# Patient Record
Sex: MALE | Race: WHITE
[De-identification: names, ages, dates, MRNs, and addresses within clinical notes are randomized per-mention and may not be internally consistent; named-entity substitution may affect disease eponyms.]

---

## 2018-03-10 ENCOUNTER — HOSPITAL ENCOUNTER (EMERGENCY)
Dept: HOSPITAL 41 - JD.ED | Age: 63
Discharge: HOME | End: 2018-03-10
Payer: COMMERCIAL

## 2018-03-10 VITALS — DIASTOLIC BLOOD PRESSURE: 78 MMHG | SYSTOLIC BLOOD PRESSURE: 171 MMHG

## 2018-03-10 DIAGNOSIS — N13.2: Primary | ICD-10-CM

## 2018-03-10 DIAGNOSIS — Z79.82: ICD-10-CM

## 2018-03-10 DIAGNOSIS — E03.9: ICD-10-CM

## 2018-03-10 DIAGNOSIS — Z79.899: ICD-10-CM

## 2018-03-10 DIAGNOSIS — E78.00: ICD-10-CM

## 2018-03-10 DIAGNOSIS — I10: ICD-10-CM

## 2018-03-10 DIAGNOSIS — K21.9: ICD-10-CM

## 2018-03-10 PROCEDURE — 96374 THER/PROPH/DIAG INJ IV PUSH: CPT

## 2018-03-10 PROCEDURE — 74019 RADEX ABDOMEN 2 VIEWS: CPT

## 2018-03-10 PROCEDURE — 86140 C-REACTIVE PROTEIN: CPT

## 2018-03-10 PROCEDURE — 36415 COLL VENOUS BLD VENIPUNCTURE: CPT

## 2018-03-10 PROCEDURE — 96361 HYDRATE IV INFUSION ADD-ON: CPT

## 2018-03-10 PROCEDURE — 74176 CT ABD & PELVIS W/O CONTRAST: CPT

## 2018-03-10 PROCEDURE — 99285 EMERGENCY DEPT VISIT HI MDM: CPT

## 2018-03-10 PROCEDURE — 96376 TX/PRO/DX INJ SAME DRUG ADON: CPT

## 2018-03-10 PROCEDURE — 80053 COMPREHEN METABOLIC PANEL: CPT

## 2018-03-10 PROCEDURE — 85025 COMPLETE CBC W/AUTO DIFF WBC: CPT

## 2018-03-10 PROCEDURE — 81001 URINALYSIS AUTO W/SCOPE: CPT

## 2018-03-10 PROCEDURE — 96375 TX/PRO/DX INJ NEW DRUG ADDON: CPT

## 2018-03-12 NOTE — CT
CT abdomen and pelvis

 

Technique: Multiple axial sections were obtained from above the dome 

of the diaphragm inferiorly through the pubic symphysis.  Intravenous 

and oral contrast not utilized.  Study has been performed as a 

ureteral stone protocol.

 

Comparison: Prior abdominal x-ray performed earlier on the same day as

 well as previous CT abdomen and pelvis exam of 01/06/13.

 

Findings: Right kidney shows hydronephrosis.  Inflammatory change is 

noted around the right kidney as well as right ureter.  Findings are 

caused by an obstructing stone within the distal right ureter 

measuring approximately 6-7 mm.  This ureteral stone is located 

approximately 2-3 cm from the UVJ.

 

No additional ureteral calculi are seen.  No renal calculi are 

identified.  Three cysts are seen within the left kidney.  Largest 

cyst measures 6.1 cm in size and has increased in size from previous 

study which measured approximately 4.0 cm on previous exam.  Second 

cyst measures 2.2 cm which is also increased in size from prior study 

at which time it measured 1.1 cm.  Third cyst seen is more inferiorly 

within the right kidney measuring 1.9 cm in size which has also 

increased slightly from prior exam.

 

Diffuse fatty infiltration seen within the liver.  Visualized lung 

bases show slight fibrosis.  Spleen appears within normal limits.  

Adrenal glands show no nodule.  Pancreas is within normal limits.  

Gallbladder contains no calcified gallstones.  Aorta shows mild 

atherosclerotic change without aneurysmal dilatation.  No 

retroperitoneal adenopathy is seen.  Numerous diverticuli are seen 

throughout the colon.  No inflammatory change of diverticulitis is 

seen.  Prostate gland is mildly enlarged and contains calcifications. 

 No free fluid is seen.  Appendix felt to be seen and is normal in 

size.

 

Bone window settings were reviewed which show scattered degenerative 

change within the spine with disc space narrowing, endplate 

osteophytes as well as vacuum phenomena within the L3-L4 and L4-L5 

discs.

 

Impression:

1.  Distal right ureteral stone measuring approximately 6-7 mm in 

size.

2.  Other nonacute findings as described above.

 

Diagnostic code #3

## 2019-04-12 ENCOUNTER — HOSPITAL ENCOUNTER (EMERGENCY)
Dept: HOSPITAL 41 - JD.ED | Age: 64
Discharge: HOME | End: 2019-04-12
Payer: COMMERCIAL

## 2019-04-12 VITALS — SYSTOLIC BLOOD PRESSURE: 140 MMHG | DIASTOLIC BLOOD PRESSURE: 72 MMHG

## 2019-04-12 DIAGNOSIS — N40.1: Primary | ICD-10-CM

## 2019-04-12 DIAGNOSIS — I10: ICD-10-CM

## 2019-04-12 DIAGNOSIS — Z79.899: ICD-10-CM

## 2019-04-12 DIAGNOSIS — Z98.890: ICD-10-CM

## 2019-04-12 DIAGNOSIS — R33.8: ICD-10-CM

## 2019-04-12 DIAGNOSIS — E66.9: ICD-10-CM

## 2019-04-12 NOTE — EDM.PDOC
ED HPI GENERAL MEDICAL PROBLEM





- General


Chief Complaint: Genitourinary Problem


Stated Complaint: DIFFICULTY URINATING


Time Seen by Provider: 04/12/19 15:22


Source of Information: Reports: Patient, Family (Daughter), RN Notes Reviewed


History Limitations: Reports: No Limitations





- History of Present Illness


INITIAL COMMENTS - FREE TEXT/NARRATIVE: 





The patient states that he underwent excision of a right spermatocele at 

[facility] yesterday, Thursday, 4/11/2019. He states that he underwent 

general anesthesia for about one hour. He was discharged home the same day. He 

states that he has had a small but anticipated amount of bleeding to the 

scrotal incision, but now presents to the ED stating that he is having 

difficulty urinating. The patient has a history of BPH, but states that he 

stopped taking his tamsulosin about one month ago, because he did not think 

that it was doing much.





A bladder scan was performed here in the ED, finding 575 mL of urine. No prior 

history of urinary retention.





The patient's PCP is Dr. Mahoney.


His Urologist is Dr. Purvis.








  ** Scrotum


Pain Score (Numeric/FACES): 5





- Related Data


 Allergies











Allergy/AdvReac Type Severity Reaction Status Date / Time


 


No Known Allergies Allergy   Verified 04/12/19 15:06











Home Meds: 


 Home Meds





Allopurinol [Zyloprim] 100 mg PO DAILY 04/12/19 [History]


Ciprofloxacin [Ciprofloxacin HCl] 250 mg PO BID 04/12/19 [History]


Codeine/Promethazine [Phenergan with Codeine] 5 ml PO Q4H PRN 04/12/19 [History]


Fenofibrate Nanocrystallized [Fenofibrate] 145 mg PO DAILY 04/12/19 [History]


Flaxseed Oil 1,000 mg PO DAILY 04/12/19 [History]


Hydrocodone/Acetaminophen [Hydrocodon-Acetaminophen 5-325] 1 - 2 tab PO Q4H PRN 

04/12/19 [History]


Levothyroxine [Synthroid] 25 mcg PO DAILY 04/12/19 [History]


Losartan [Cozaar] 50 mg PO DAILY 04/12/19 [History]


Metoprolol Tartrate [Lopressor] 25 mg PO BID 04/12/19 [History]


Mometasone Furoate [Nasonex Spray] 2 spray RENETTA DAILY 04/12/19 [History]


Montelukast [Singulair] 10 mg PO BEDTIME 04/12/19 [History]


Niacin [Niacin ER] 500 mg PO BID 04/12/19 [History]


Omega-3/DHA/Epa/Fish Oil [Omega 3 500 Softgel] 1,000 mg PO DAILY 04/12/19 [

History]


Omeprazole 20 mg PO BID 04/12/19 [History]


Sildenafil Citrate [Sildenafil] 100 mg PO ASDIRECTED PRN 04/12/19 [History]


Tamsulosin [Flomax] 0.4 mg PO DAILY 04/12/19 [History]


atorvaSTATin [Lipitor] 20 mg PO DAILY 04/12/19 [History]


metFORMIN [Glucophage XR] 500 mg PO BIDMEALS 04/12/19 [History]











Past Medical History


HEENT History: Reports: Impaired Vision


Cardiovascular History: Reports: High Cholesterol, Hypertension


Respiratory History: Reports: Sleep Apnea (nightly CPAP 12)


Gastrointestinal History: Reports: Diverticulosis (diverticulitis), GERD, 

Hemorrhoids, Other (See Below) (Hepatic steatosis)


Genitourinary History: Reports: BPH (untreated), Renal Calculus


Musculoskeletal History: Reports: Gout


Endocrine/Metabolic History: Reports: Hypothyroidism, Obesity/BMI 30+, Other (

See Below) (Prediabetes)





- Past Surgical History


GI Surgical History: Reports: Colonoscopy (x 3), EGD (x 3), Hernia, Abdominal (

Paraumbilical, 1975)


Male  Surgical History: Reports: Other (See Below) (Right spermatocele 

excision 4/11/2019)


Dermatological Surgical History: Reports: Other (See Below) (Lipoma excised 

from under arm)





Social & Family History





- Tobacco Use


Smoking Status *Q: Never Smoker





- Caffeine Use


Caffeine Use: Reports: Soda


Other Caffeine Use: diet pepsi





- Alcohol Use


Alcohol Use History: Yes


Alcohol Use Frequency: Rarely





- Recreational Drug Use


Recreational Drug Use: No





- Living Situation & Occupation


Living situation: Reports: , Alone


Occupation: Retired





ED ROS GENERAL





- Review of Systems


Review Of Systems: ROS reveals no pertinent complaints other than HPI.





ED EXAM, RENAL/





- Physical Exam


Exam: See Below


Exam Limited By: No Limitations


General Appearance: Alert, WD/WN, No Apparent Distress


Eye Exam: Bilateral Eye: EOMI, Normal Inspection


Ears: Normal External Exam, Hearing Grossly Normal


Nose: Normal Inspection


Throat/Mouth: Normal Inspection, Normal Lips, Normal Voice, No Airway Compromise


Head: Atraumatic, Normocephalic


Neck: Normal Inspection, Full Range of Motion


Respiratory/Chest: No Respiratory Distress, Lungs Clear, Normal Breath Sounds, 

No Accessory Muscle Use


Cardiovascular: Normal Peripheral Pulses, Regular Rate, Rhythm, No Gallop, No 

JVD, No Murmur, No Rub


GI/Abdominal: Normal Bowel Sounds, Soft, No Organomegaly, No Distention, No 

Abnormal Bruit, No Mass, Tender (Mild, suprapubic only. Nontender elsewhere.), 

Other (Obese)


 (Male) Exam: Deferred


Rectal (Males) Exam: Deferred


Back Exam: Normal Inspection, Full Range of Motion, NT


Extremities: Normal Inspection, Normal Range of Motion, No Pedal Edema, Normal 

Capillary Refill


Neurological: Alert, Oriented, Normal Cognition, No Motor/Sensory Deficits


Psychiatric: Normal Affect


Skin Exam: Warm, Dry, Intact, Normal Color, No Rash





Course





- Vital Signs


Last Recorded V/S: 


 Last Vital Signs











Temp  37.1 C   04/12/19 14:57


 


Pulse  60   04/12/19 14:57


 


Resp  13   04/12/19 14:57


 


BP  140/72   04/12/19 14:57


 


Pulse Ox  95   04/12/19 14:57














- Orders/Labs/Meds


Orders: 


 Active Orders 24 hr











 Category Date Time Status


 


 Bladder Scan [RC] ASDIRECTED Care  04/12/19 15:32 Ordered














- Re-Assessments/Exams


Free Text/Narrative Re-Assessment/Exam: 





04/12/19 15:40


A bladder scan after the patient arrived to the ED found 575 mL of urine, 

however, this was not a post-void residual, as the patient states that he last 

urinated around 14:00 this afternoon. I have asked the patient to urinate as 

much as he can, after which we will check a post-void residual.








04/12/19 16:01


Post-void residual was 280 mL. I offered to place a catheter to a leg bag, 

however, the patient had been told by Dr. Purvis's office that he would not 

need one unless his residual was 300 mL or greater. The patient therefore 

declined a catheter. I will discharge him home with the recommendation that he 

restart his tamsulosin immediately, and return to the ED if he has any problems 

over the weekend. The patient will then follow-up with Dr. Purvis on Monday, 4

/15/2019.





Departure





- Departure


Time of Disposition: 16:06


Disposition: Home, Self-Care 01


Condition: Good


Clinical Impression: 


 Postoperative urinary retention, BPH (benign prostatic hyperplasia)








- Discharge Information


*PRESCRIPTION DRUG MONITORING PROGRAM REVIEWED*: Not Applicable


*COPY OF PRESCRIPTION DRUG MONITORING REPORT IN PATIENT [facility]: Not Applicable


Referrals: 


Favian Mahoney MD [Primary Care Provider] - 


Eden Pruvis MD [Consulting Physician] - 


Forms:  ED Department Discharge


Additional Instructions: 


You were seen in the emergency room after having difficulty urinating, 

following spermatocele surgery yesterday.





Your post-void residual was found to be 280 mL. Catheterization with a leg bag 

was offered, but declined.





We recommend that you restart your tamsulosin (Flomax) as soon as possible.





If you have any difficulty urinating over the weekend, please do not hesitate 

to return to the ER. Otherwise, please follow-up with your Urologist, Dr. Purvis, Monday, 4/15/2019.





- My Orders


Last 24 Hours: 


My Active Orders





04/12/19 15:32


Bladder Scan [RC] ASDIRECTED 














- Assessment/Plan


Last 24 Hours: 


My Active Orders





04/12/19 15:32


Bladder Scan [RC] ASDIRECTED

## 2020-07-29 NOTE — EDM.PDOC
ED HPI GENERAL MEDICAL PROBLEM





- General


Chief Complaint: Respiratory Problem


Stated Complaint: HIGH FEVER/COUGH/SHIVERING


Time Seen by Provider: 07/29/20 20:20


Source of Information: Reports: Patient


History Limitations: Reports: No Limitations





- History of Present Illness


INITIAL COMMENTS - FREE TEXT/NARRATIVE: 


65-year-old male presents to the ED for evaluation of acute onset of high fever 

with Rigor's and chills about 1600 hrs. today.  He did not feel real well 

yesterday with the onset of diffuse low back pain and was seen by his primary 

care provider  and had x-rays of his lower back which revealed 

degenerative arthritic change.  Currently having diffuse low back pain radiating

to both upper buttocks but not down past the mid thigh posteriorly.  Started on 

a course of prednisone which she started this morning and was referred to 

physio-which he is to start next Monday.  Did not feel real well this morning 

and slept until about noon.  He had a sausage kathy about noon at home.  

Subsequently he did have a strawberry Sunday from the [employer] about 1500 

hrs. today.  Developed fever and chills about 1600 hrs. today with his teeth 

almost chattering.  States he went out and sat on the hot sun for a while until 

he can warm up.  When he presented to the ED tonight his temperature is 103.6.  

He reports he did have a COVID test done in the clinic yesterday and it was sent

off and he did not receive results yet.  Reports he was out in [place] 

last week with many other people with no mask wearing.  No sore throat or runny 

nose.  Nuys any genitourinary complaints.  He did have 1 bout of diarrhea stool 

this morning yellow in color without blood.  Patient has not taken anything for 

fever tonight.  He admits that he does feel short of breath on exertion.  O2 

sats are 90 to 92% on room air in the ED.





Onset: Today, Sudden


Onset Date: 07/29/20


Onset Time: 16:00 (New onset of severe Rigors and chills. )


Duration: Hour(s):, Constant


Location: Reports: Chest (Mild shortness of breath.  Hypoxia on examination.  

Dry cough without any sputum production.), Back (Diffuse low back pain for the 

last 3 days.)


Quality: Reports: Other (New onset of fever and chills about 1600 hrs. today.)


Severity: Moderate


Improves with: Reports: None


Worsens with: Reports: None, Other (Back pain worsened by walking.)


Context: Denies: Activity, Exercise, Lifting, Sick Contact, Trauma


Associated Symptoms: Reports: Cough, Diaphoresis, Fever/Chills, Loss of 

Appetite, Malaise, Shortness of Breath, Weakness.  Denies: No Other Symptoms, 

Confusion (Productive.), Chest Pain, Headaches (Temperatures 39.1 at the time is

seen in the ED.), Nausea/Vomiting, Rash, Seizure, Syncope


Treatments PTA: Reports: Other (see below) (None.)





- Related Data


                                    Allergies











Allergy/AdvReac Type Severity Reaction Status Date / Time


 


No Known Allergies Allergy   Verified 07/29/20 20:11











Home Meds: 


                                    Home Meds





Fenofibrate Nanocrystallized [Fenofibrate] 145 mg PO DAILY 04/12/19 [History]


Flaxseed Oil 1,000 mg PO DAILY 04/12/19 [History]


Levothyroxine [Synthroid] 25 mcg PO DAILY 04/12/19 [History]


Losartan [Cozaar] 50 mg PO DAILY 04/12/19 [History]


Metoprolol Tartrate [Lopressor] 25 mg PO BID 04/12/19 [History]


Mometasone Furoate [Nasonex Spray] 2 spray RENETTA DAILY 04/12/19 [History]


Montelukast [Singulair] 10 mg PO BEDTIME 04/12/19 [History]


Niacin [Niacin ER] 500 mg PO BID 04/12/19 [History]


Omega-3/DHA/Epa/Fish Oil [Omega 3 500 Softgel] 1,000 mg PO BID 04/12/19 

[History]


Omeprazole 20 mg PO BID 04/12/19 [History]


Tamsulosin [Flomax] 0.4 mg PO DAILY 04/12/19 [History]


allopurinoL [Zyloprim] 100 mg PO DAILY 04/12/19 [History]


atorvaSTATin [Lipitor] 20 mg PO DAILY 04/12/19 [History]


metFORMIN [Glucophage XR] 500 mg PO BIDMEALS 04/12/19 [History]


Aspirin 81 mg PO DAILY 07/29/20 [History]


[employer]  1 tab PO DAILY 07/29/20 [History]


predniSONE [Prednisone] 40 mg PO DAILY 07/29/20 [History]











Past Medical History


HEENT History: Reports: Impaired Vision


Cardiovascular History: Reports: High Cholesterol, Hypertension


Respiratory History: Reports: Sleep Apnea


Other Respiratory History: c-pap at night


Gastrointestinal History: Reports: Diverticulosis, GERD, Hemorrhoids, Other (See

 Below)


Genitourinary History: Reports: BPH, Renal Calculus


Musculoskeletal History: Reports: Gout


Endocrine/Metabolic History: Reports: Hypothyroidism, Obesity/BMI 30+


Other Endocrine/Metabolic History: pre diabetic





- Past Surgical History


GI Surgical History: Reports: Colonoscopy, EGD, Hernia, Abdominal, Polypectomy





Social & Family History





- Caffeine Use


Caffeine Use: Reports: Soda


Other Caffeine Use: diet pepsi





- Living Situation & Occupation


Living situation: Reports: , Alone


Occupation: Retired





ED ROS GENERAL





- Review of Systems


Review Of Systems: See Below


Constitutional: Reports: Fever, Chills (Darting about 1600 hrs. today.), 

Malaise, Weakness, Fatigue, Diaphoresis, Decreased Appetite.  Denies: Weight 

Loss


HEENT: Reports: Glasses


Respiratory: Reports: Shortness of Breath, Cough.  Denies: Wheezing, Pleuritic 

Chest Pain, Sputum, Hemoptysis


Cardiovascular: Reports: Blood Pressure Problem, Dyspnea on Exertion.  Denies: 

Chest Pain, Claudication, Edema, Lightheadedness, Orthopnea, Palpitations (The 

last 2 days.)


Endocrine: Reports: Fatigue


GI/Abdominal: Reports: Diarrhea.  Denies: Abdominal Pain, Hematemesis (Loose 

diarrhea stool this morning watery yellow no blood), Hematochezia, Nausea, Stool

 Incontinence, Vomiting


: Reports: Frequency, Other (.  Usually x2.  Known BPH.)


Musculoskeletal: Reports: Back Pain (For his low back pain radiating to both 

upper buttocks x3 days.  X-rays of his back done back in the clinic by his 

private care physician yesterday's apparently revealed diffuse degenerative 

arthritic changes.  He was started on prednisone which he did take this morning.

  [facility] for physical therapy next Monday.)


Skin: Reports: No Symptoms


Neurological: Reports: Difficulty Walking, Weakness.  Denies: Confusion, 

Dizziness, Headache, Numbness, Syncope, Tingling, Trouble Speaking, Change in 

Speech (Due to weakness.), Gait Disturbance


Psychiatric: Reports: No Symptoms


Hematologic/Lymphatic: Reports: No Symptoms


Immunologic: Reports: No Symptoms





ED EXAM, GENERAL





- Physical Exam


Exam: See Below


Exam Limited By: No Limitations


General Appearance: Alert, WD/WN, Mild Distress, Other (Patient is very warm to 

palpation.  Temperatures recorded at 39.1.  Heart rate 106 and sinus respiratory

 20 and sinus sats 90 to 92% room air.  /84.)


Eye Exam: Bilateral Eye: Normal Inspection (No blepharal pallor or scleral 

icterus.), PERRL


Ears: Normal TMs


Nose: Normal Inspection


Throat/Mouth: Normal Lips, Normal Oropharynx, Other


Head: Atraumatic, Normocephalic.  No: Facial Swelling, Facial Tenderness


Neck: Normal Inspection, Supple, Limited Range of Motion (He has loss of 5 

degrees lateral flexion and rotation), Tender Lateral (Tenderness bilateral 

cervical spine.).  No: Full Range of Motion, Carotid Bruit, Lymphadenopathy (L) 

( and 5 degrees extension.), Lymphadenopathy (R), Thyromegaly


Respiratory/Chest: Lungs Clear, Normal Breath Sounds, No Accessory Muscle Use, 

Chest Non-Tender, Respiratory Distress (Tachypnea at rest.)


Cardiovascular: Normal Peripheral Pulses, No Edema, No Gallop (Mild sinus 

tachycardia 106/min), No Murmur, No Rub, Tachycardia


Peripheral Pulses: 2+: Posterior Tibial (L), Posterior Tibial (R), Dorsalis 

Pedis (L), Dorsalis Pedis (R), 3+: Carotid (L), Carotid (R)


GI/Abdominal: Normal Bowel Sounds, Soft, Non-Tender, No Organomegaly, No 

Abnormal Bruit, No Mass, Pelvis Stable, Other (Moderately obese.  Umbilical 

hernia which is protruding but easily reducible and nontender to the patient.  

Midline laparotomy scar from previous ventral hernia repair and mesh graft 

placement.  He has had previous laparoscopic cholecystectomy.  Abdomen is quite 

obese.  Firm palpation with no organomegaly noted.)


Back Exam: Decreased Range of Motion, Vertebral Tenderness (Tenderness along 

both sides of the lumbar spine lumbar 1 to lumbar 5 bilaterally.)


Extremities: Normal Inspection (  Pain particular about over L5-S1 facet joints 

bilaterally.), No Pedal Edema, Other


Neurological: Alert (No signs of mild arthritic change both knees and limited 

external rotation both hips to suggest arthritis strains as well.), Oriented, CN

 II-XII Intact, Normal Cognition


Psychiatric: Normal Affect, Normal Mood


Skin Exam: Warm, Dry, Intact, Normal Color, No Rash, Other (She has erythematous

 face and is very warm palpation.)





Course





- Vital Signs


Last Recorded V/S: 


                                Last Vital Signs











Temp  39.1 C H  07/29/20 20:57


 


Pulse  102 H  07/29/20 20:12


 


Resp  19   07/29/20 20:12


 


BP  150/84 H  07/29/20 20:12


 


Pulse Ox  90 L  07/29/20 20:12














- Orders/Labs/Meds


Orders: 


                               Active Orders 24 hr











 Category Date Time Status


 


 Blood Glucose Check, Bedside [RC] ONETIME Care  07/29/20 20:35 Active


 


 EKG Documentation Completion [RC] STAT Care  07/29/20 20:52 Active


 


 Oxygen Therapy [RC] ASDIRECTED Care  07/29/20 20:35 Active


 


 Chest 2V [CR] Stat Exams  07/29/20 20:34 Taken


 


 CULTURE BLOOD [BC] Stat Lab  07/29/20 21:08 Received


 


 CULTURE BLOOD [BC] Stat Lab  07/29/20 21:18 Received


 


 Sodium Chloride 0.9% [Normal Saline] 1,000 ml Med  07/29/20 20:45 Active





 IV ASDIRECTED   


 


 cefTRIAXone [Rocephin] 2 gm Med  07/29/20 22:00 Active





 Sodium Chloride 0.9% [Normal Saline] 100 ml   





 IV Q24H   


 


 Blood Culture x2 Reflex Set [OM.PC] Stat Oth  07/29/20 20:36 Ordered








                                Medication Orders





Sodium Chloride (Normal Saline)  1,000 mls @ 150 mls/hr IV ASDIRECTED JOELLE


   Last Admin: 07/29/20 20:56  Dose: 150 mls/hr


   Documented by: THOMAS


Ceftriaxone Sodium 2 gm/ (Sodium Chloride)  100 mls @ 200 mls/hr IV Q24H [facility]


   Last Admin: 07/29/20 22:13  Dose: 200 mls/hr


   Documented by: SHELL








Labs: 


                                Laboratory Tests











  07/29/20 07/29/20 07/29/20 Range/Units





  20:50 20:55 20:55 


 


WBC   12.21 H   (4.23-9.07)  K/mm3


 


RBC   5.52   (4.63-6.08)  M/mm3


 


Hgb   16.3   (13.7-17.5)  gm/dl


 


Hct   49.3   (40.1-51.0)  %


 


MCV   89.3   (79.0-92.2)  fl


 


MCH   29.5   (25.7-32.2)  pg


 


MCHC   33.1   (32.2-35.5)  g/dl


 


RDW Std Deviation   44.4 H   (35.1-43.9)  fL


 


Plt Count   225   (163-337)  K/mm3


 


MPV   10.4   (9.4-12.3)  fl


 


Neutrophils % (Manual)   79 H   (40-60)  %


 


Band Neutrophils %   0   (0-10)  %


 


Lymphocytes % (Manual)   13 L   (20-40)  %


 


Atypical Lymphs %   4   %


 


Monocytes % (Manual)   4   (2-10)  %


 


Eosinophils % (Manual)   0 L   (0.8-7.0)  %


 


Basophils % (Manual)   0 L   (0.2-1.2)  


 


Platelet Estimate   Adequate   


 


RBC Morph Comment   Normal   


 


ESR     (0-15)  mm/hr


 


PT    10.9  (9.7-12.0)  SECONDS


 


INR    1.00  


 


APTT    27  (22-31)  SECONDS


 


D-Dimer, Quantitative    0.41  (0.19-0.50)  mg/L


 


ABG pH     (7.35-7.45)  


 


ABG pCO2     (35.0-45.0)  mmHg


 


ABG pO2     (80.0-100.0)  mmHg


 


ABG HCO3     (22.0-26.0)  meq/L


 


ABG O2 Saturation     (96.0-97.0)  %


 


ABG Base Excess     (-2-2.0)  


 


Antony Test     


 


FiO2     (21..00)  %


 


Sodium     (136-145)  mEq/L


 


Potassium     (3.5-5.1)  mEq/L


 


Chloride     ()  mEq/L


 


Carbon Dioxide     (21-32)  mEq/L


 


Anion Gap     (5-15)  


 


BUN     (7-18)  mg/dL


 


Creatinine     (0.7-1.3)  mg/dL


 


Est Cr Clr Drug Dosing     mL/min


 


Estimated GFR (MDRD)     (>60)  mL/min


 


BUN/Creatinine Ratio     (14-18)  


 


Glucose     ()  mg/dL


 


POC Glucose     ()  mg/dL


 


Lactic Acid     (0.4-2.0)  mmol/L


 


Calcium     (8.5-10.1)  mg/dL


 


Magnesium     (1.8-2.4)  mg/dl


 


Ferritin     ()  ng/ml


 


Total Bilirubin     (0.2-1.0)  mg/dL


 


AST     (15-37)  U/L


 


ALT     (16-63)  U/L


 


Alkaline Phosphatase     ()  U/L


 


Lactate Dehydrogenase     ()  U/L


 


Troponin I     (0.00-0.056)  ng/mL


 


C-Reactive Protein     (<1.0)  mg/dL


 


NT-Pro-B Natriuret Pep     (0-125)  pg/mL


 


Total Protein     (6.4-8.2)  g/dl


 


Albumin     (3.4-5.0)  g/dl


 


Globulin     gm/dL


 


Albumin/Globulin Ratio     (1-2)  


 


Urine Color  Yellow    (Yellow)  


 


Urine Appearance  Clear    (Clear)  


 


Urine pH  6.0    (5.0-8.0)  


 


Ur Specific Gravity  1.025    (1.005-1.030)  


 


Urine Protein  Negative    (Negative)  


 


Urine Glucose (UA)  Negative    (Negative)  


 


Urine Ketones  Negative    (Negative)  


 


Urine Occult Blood  Trace-intact H    (Negative)  


 


Urine Nitrite  Negative    (Negative)  


 


Urine Bilirubin  Negative    (Negative)  


 


Urine Urobilinogen  0.2    (0.2-1.0)  


 


Ur Leukocyte Esterase  Negative    (Negative)  


 


Urine RBC  0-5    (0-5)  /hpf


 


Urine WBC  0-5    (0-5)  /hpf


 


Ur Squamous Epith Cells  0-5    (0-5)  /hpf


 


Urine Bacteria  Occasional    (FEW)  /hpf


 


Urine Mucus  Few    (FEW)  /hpf


 


Ketones     (0.0-0.3)  mM


 


SARS Virus RNA (PCR)     (NEGATIVE)  














  07/29/20 07/29/20 07/29/20 Range/Units





  20:55 20:55 20:55 


 


WBC     (4.23-9.07)  K/mm3


 


RBC     (4.63-6.08)  M/mm3


 


Hgb     (13.7-17.5)  gm/dl


 


Hct     (40.1-51.0)  %


 


MCV     (79.0-92.2)  fl


 


MCH     (25.7-32.2)  pg


 


MCHC     (32.2-35.5)  g/dl


 


RDW Std Deviation     (35.1-43.9)  fL


 


Plt Count     (163-337)  K/mm3


 


MPV     (9.4-12.3)  fl


 


Neutrophils % (Manual)     (40-60)  %


 


Band Neutrophils %     (0-10)  %


 


Lymphocytes % (Manual)     (20-40)  %


 


Atypical Lymphs %     %


 


Monocytes % (Manual)     (2-10)  %


 


Eosinophils % (Manual)     (0.8-7.0)  %


 


Basophils % (Manual)     (0.2-1.2)  


 


Platelet Estimate     


 


RBC Morph Comment     


 


ESR     (0-15)  mm/hr


 


PT     (9.7-12.0)  SECONDS


 


INR     


 


APTT     (22-31)  SECONDS


 


D-Dimer, Quantitative     (0.19-0.50)  mg/L


 


ABG pH     (7.35-7.45)  


 


ABG pCO2     (35.0-45.0)  mmHg


 


ABG pO2     (80.0-100.0)  mmHg


 


ABG HCO3     (22.0-26.0)  meq/L


 


ABG O2 Saturation     (96.0-97.0)  %


 


ABG Base Excess     (-2-2.0)  


 


Antony Test     


 


FiO2     (21..00)  %


 


Sodium  133 L    (136-145)  mEq/L


 


Potassium  4.0    (3.5-5.1)  mEq/L


 


Chloride  97 L    ()  mEq/L


 


Carbon Dioxide  25    (21-32)  mEq/L


 


Anion Gap  15.0    (5-15)  


 


BUN  21 H    (7-18)  mg/dL


 


Creatinine  1.4 H    (0.7-1.3)  mg/dL


 


Est Cr Clr Drug Dosing  52.25    mL/min


 


Estimated GFR (MDRD)  51    (>60)  mL/min


 


BUN/Creatinine Ratio  15.0    (14-18)  


 


Glucose  127 H    ()  mg/dL


 


POC Glucose     ()  mg/dL


 


Lactic Acid    1.6  (0.4-2.0)  mmol/L


 


Calcium  9.8    (8.5-10.1)  mg/dL


 


Magnesium  1.5 L    (1.8-2.4)  mg/dl


 


Ferritin   653 H   ()  ng/ml


 


Total Bilirubin  0.8    (0.2-1.0)  mg/dL


 


AST  26    (15-37)  U/L


 


ALT  65 H    (16-63)  U/L


 


Alkaline Phosphatase  64    ()  U/L


 


Lactate Dehydrogenase  108    ()  U/L


 


Troponin I  < 0.017    (0.00-0.056)  ng/mL


 


C-Reactive Protein  5.9 H*    (<1.0)  mg/dL


 


NT-Pro-B Natriuret Pep     (0-125)  pg/mL


 


Total Protein  7.8    (6.4-8.2)  g/dl


 


Albumin  3.6    (3.4-5.0)  g/dl


 


Globulin  4.2    gm/dL


 


Albumin/Globulin Ratio  0.9 L    (1-2)  


 


Urine Color     (Yellow)  


 


Urine Appearance     (Clear)  


 


Urine pH     (5.0-8.0)  


 


Ur Specific Gravity     (1.005-1.030)  


 


Urine Protein     (Negative)  


 


Urine Glucose (UA)     (Negative)  


 


Urine Ketones     (Negative)  


 


Urine Occult Blood     (Negative)  


 


Urine Nitrite     (Negative)  


 


Urine Bilirubin     (Negative)  


 


Urine Urobilinogen     (0.2-1.0)  


 


Ur Leukocyte Esterase     (Negative)  


 


Urine RBC     (0-5)  /hpf


 


Urine WBC     (0-5)  /hpf


 


Ur Squamous Epith Cells     (0-5)  /hpf


 


Urine Bacteria     (FEW)  /hpf


 


Urine Mucus     (FEW)  /hpf


 


Ketones     (0.0-0.3)  mM


 


SARS Virus RNA (PCR)     (NEGATIVE)  














  07/29/20 07/29/20 07/29/20 Range/Units





  20:55 20:55 20:55 


 


WBC     (4.23-9.07)  K/mm3


 


RBC     (4.63-6.08)  M/mm3


 


Hgb     (13.7-17.5)  gm/dl


 


Hct     (40.1-51.0)  %


 


MCV     (79.0-92.2)  fl


 


MCH     (25.7-32.2)  pg


 


MCHC     (32.2-35.5)  g/dl


 


RDW Std Deviation     (35.1-43.9)  fL


 


Plt Count     (163-337)  K/mm3


 


MPV     (9.4-12.3)  fl


 


Neutrophils % (Manual)     (40-60)  %


 


Band Neutrophils %     (0-10)  %


 


Lymphocytes % (Manual)     (20-40)  %


 


Atypical Lymphs %     %


 


Monocytes % (Manual)     (2-10)  %


 


Eosinophils % (Manual)     (0.8-7.0)  %


 


Basophils % (Manual)     (0.2-1.2)  


 


Platelet Estimate     


 


RBC Morph Comment     


 


ESR   19 H   (0-15)  mm/hr


 


PT     (9.7-12.0)  SECONDS


 


INR     


 


APTT     (22-31)  SECONDS


 


D-Dimer, Quantitative     (0.19-0.50)  mg/L


 


ABG pH     (7.35-7.45)  


 


ABG pCO2     (35.0-45.0)  mmHg


 


ABG pO2     (80.0-100.0)  mmHg


 


ABG HCO3     (22.0-26.0)  meq/L


 


ABG O2 Saturation     (96.0-97.0)  %


 


ABG Base Excess     (-2-2.0)  


 


Antony Test     


 


FiO2     (21..00)  %


 


Sodium     (136-145)  mEq/L


 


Potassium     (3.5-5.1)  mEq/L


 


Chloride     ()  mEq/L


 


Carbon Dioxide     (21-32)  mEq/L


 


Anion Gap     (5-15)  


 


BUN     (7-18)  mg/dL


 


Creatinine     (0.7-1.3)  mg/dL


 


Est Cr Clr Drug Dosing     mL/min


 


Estimated GFR (MDRD)     (>60)  mL/min


 


BUN/Creatinine Ratio     (14-18)  


 


Glucose     ()  mg/dL


 


POC Glucose     ()  mg/dL


 


Lactic Acid     (0.4-2.0)  mmol/L


 


Calcium     (8.5-10.1)  mg/dL


 


Magnesium     (1.8-2.4)  mg/dl


 


Ferritin     ()  ng/ml


 


Total Bilirubin     (0.2-1.0)  mg/dL


 


AST     (15-37)  U/L


 


ALT     (16-63)  U/L


 


Alkaline Phosphatase     ()  U/L


 


Lactate Dehydrogenase     ()  U/L


 


Troponin I     (0.00-0.056)  ng/mL


 


C-Reactive Protein     (<1.0)  mg/dL


 


NT-Pro-B Natriuret Pep    115  (0-125)  pg/mL


 


Total Protein     (6.4-8.2)  g/dl


 


Albumin     (3.4-5.0)  g/dl


 


Globulin     gm/dL


 


Albumin/Globulin Ratio     (1-2)  


 


Urine Color     (Yellow)  


 


Urine Appearance     (Clear)  


 


Urine pH     (5.0-8.0)  


 


Ur Specific Gravity     (1.005-1.030)  


 


Urine Protein     (Negative)  


 


Urine Glucose (UA)     (Negative)  


 


Urine Ketones     (Negative)  


 


Urine Occult Blood     (Negative)  


 


Urine Nitrite     (Negative)  


 


Urine Bilirubin     (Negative)  


 


Urine Urobilinogen     (0.2-1.0)  


 


Ur Leukocyte Esterase     (Negative)  


 


Urine RBC     (0-5)  /hpf


 


Urine WBC     (0-5)  /hpf


 


Ur Squamous Epith Cells     (0-5)  /hpf


 


Urine Bacteria     (FEW)  /hpf


 


Urine Mucus     (FEW)  /hpf


 


Ketones  <0.01    (0.0-0.3)  mM


 


SARS Virus RNA (PCR)     (NEGATIVE)  














  07/29/20 07/29/20 07/29/20 Range/Units





  20:58 21:45 22:24 


 


WBC     (4.23-9.07)  K/mm3


 


RBC     (4.63-6.08)  M/mm3


 


Hgb     (13.7-17.5)  gm/dl


 


Hct     (40.1-51.0)  %


 


MCV     (79.0-92.2)  fl


 


MCH     (25.7-32.2)  pg


 


MCHC     (32.2-35.5)  g/dl


 


RDW Std Deviation     (35.1-43.9)  fL


 


Plt Count     (163-337)  K/mm3


 


MPV     (9.4-12.3)  fl


 


Neutrophils % (Manual)     (40-60)  %


 


Band Neutrophils %     (0-10)  %


 


Lymphocytes % (Manual)     (20-40)  %


 


Atypical Lymphs %     %


 


Monocytes % (Manual)     (2-10)  %


 


Eosinophils % (Manual)     (0.8-7.0)  %


 


Basophils % (Manual)     (0.2-1.2)  


 


Platelet Estimate     


 


RBC Morph Comment     


 


ESR     (0-15)  mm/hr


 


PT     (9.7-12.0)  SECONDS


 


INR     


 


APTT     (22-31)  SECONDS


 


D-Dimer, Quantitative     (0.19-0.50)  mg/L


 


ABG pH   7.45   (7.35-7.45)  


 


ABG pCO2   35.6   (35.0-45.0)  mmHg


 


ABG pO2   67.0 L   (80.0-100.0)  mmHg


 


ABG HCO3   24.5   (22.0-26.0)  meq/L


 


ABG O2 Saturation   92.7 L   (96.0-97.0)  %


 


ABG Base Excess   1.5   (-2-2.0)  


 


Antony Test   Positive   


 


FiO2   0.00 L   (21..00)  %


 


Sodium     (136-145)  mEq/L


 


Potassium     (3.5-5.1)  mEq/L


 


Chloride     ()  mEq/L


 


Carbon Dioxide     (21-32)  mEq/L


 


Anion Gap     (5-15)  


 


BUN     (7-18)  mg/dL


 


Creatinine     (0.7-1.3)  mg/dL


 


Est Cr Clr Drug Dosing     mL/min


 


Estimated GFR (MDRD)     (>60)  mL/min


 


BUN/Creatinine Ratio     (14-18)  


 


Glucose     ()  mg/dL


 


POC Glucose    117 H  ()  mg/dL


 


Lactic Acid     (0.4-2.0)  mmol/L


 


Calcium     (8.5-10.1)  mg/dL


 


Magnesium     (1.8-2.4)  mg/dl


 


Ferritin     ()  ng/ml


 


Total Bilirubin     (0.2-1.0)  mg/dL


 


AST     (15-37)  U/L


 


ALT     (16-63)  U/L


 


Alkaline Phosphatase     ()  U/L


 


Lactate Dehydrogenase     ()  U/L


 


Troponin I     (0.00-0.056)  ng/mL


 


C-Reactive Protein     (<1.0)  mg/dL


 


NT-Pro-B Natriuret Pep     (0-125)  pg/mL


 


Total Protein     (6.4-8.2)  g/dl


 


Albumin     (3.4-5.0)  g/dl


 


Globulin     gm/dL


 


Albumin/Globulin Ratio     (1-2)  


 


Urine Color     (Yellow)  


 


Urine Appearance     (Clear)  


 


Urine pH     (5.0-8.0)  


 


Ur Specific Gravity     (1.005-1.030)  


 


Urine Protein     (Negative)  


 


Urine Glucose (UA)     (Negative)  


 


Urine Ketones     (Negative)  


 


Urine Occult Blood     (Negative)  


 


Urine Nitrite     (Negative)  


 


Urine Bilirubin     (Negative)  


 


Urine Urobilinogen     (0.2-1.0)  


 


Ur Leukocyte Esterase     (Negative)  


 


Urine RBC     (0-5)  /hpf


 


Urine WBC     (0-5)  /hpf


 


Ur Squamous Epith Cells     (0-5)  /hpf


 


Urine Bacteria     (FEW)  /hpf


 


Urine Mucus     (FEW)  /hpf


 


Ketones     (0.0-0.3)  mM


 


SARS Virus RNA (PCR)  Positive H    (NEGATIVE)  











Meds: 


Medications











Generic Name Dose Route Start Last Admin





  Trade Name Freq  PRN Reason Stop Dose Admin


 


Sodium Chloride  1,000 mls @ 150 mls/hr  07/29/20 20:45  07/29/20 20:56





  Normal Saline  IV   150 mls/hr





  ASDIRECTED JOELLE   Administration


 


Ceftriaxone Sodium 2 gm/  100 mls @ 200 mls/hr  07/29/20 22:00  07/29/20 22:13





  Sodium Chloride  IV   200 mls/hr





  Q24H JOELLE   Administration














Discontinued Medications














Generic Name Dose Route Start Last Admin





  Trade Name Freq  PRN Reason Stop Dose Admin


 


Acetaminophen  975 mg  07/29/20 20:32  07/29/20 20:57





  Tylenol  PO  07/29/20 20:33  975 mg





  ONETIME ONE   Administration


 


Azithromycin 500 mg/ Sodium  250 mls @ 250 mls/hr  07/29/20 21:47  07/29/20 

22:14





  Chloride  IV  07/29/20 22:46  Not Given





  ONETIME ONE  


 


Ibuprofen  600 mg  07/29/20 23:24 





  Motrin  PO  07/29/20 23:25 





  ONETIME ONE  














- Radiology Interpretation


Free Text/Narrative:: 


65-year-old male presents to the ED with acute onset of fever, chills and rigors

 darting about 1600 hrs. today.  Was not feeling well yesterday primarily with 

low back pain but he had some symptoms to warrant COVID testing and his primary 

care physician did send off a COVID test through the [employer] system.  Leave this

 is sent to [facility] for analysis and is unknown when results may come back. 

 Patient has a dry cough.  He is markedly febrile with a temperature greater 

than 103.6 at this time.  He has no dysuria urgency or frequency and no 

abdominal pain.  Will have a repeat COVID test.  He will have the Covid  work-up

 as well as septic work-up.  2 view chest x-ray to be obtained.  ECG as well.  

IV will be normal saline at 150 mils per hour.  Patient has a history of type 2 

diabetes and controlled with metformin.  He has not checked his sugars today.  

He has lost most of his appetite today.  1 dose of prednisone this morning for 

his low back pain prescribed by his primary care physician yesterday.  He has 

not taken any Tylenol or Motrin.  Given Tylenol 9 7 5 mg p.o. in the ED.  O2 

sats are low at 90% in the ED.  Placed on oxygen 2 L/min by nasal cannula.








- Re-Assessments/Exams


Free Text/Narrative Re-Assessment/Exam: 





07/29/20 21:46 2 view chest x-ray reveals a right middle lobar pneumonia.  Is in

 the anterior segment.  Visible on the perihilar view anteriorly on the lateral 

chest x-ray.  No cardiomegaly.  No pleural effusions.  Mild hyperinflated lung 

fields.  Will be started on Rocephin 2 g IV followed by azithromycin 500 mg IV.





07/29/20 21:48 Blood cell count is elevated at 12.21.  Differential pending.  

Hemoglobin mildly elevated at 16.3 with hematocrit of 49.3 suggesting mild 

hemoconcentration.  Platelet count is 225,000.  Urinalysis shows trace of occult

 blood but no other signs of infection





07/29/20 22:00  PT is 10.9 with an INR of 1.0.  PTT is 27 with a d-dimer of 

0.41.  ABGs reveal a pH of 7.45.  PCO2 is 35.6 with a PO2 of 67.0.  Oxygen 

saturation is 92.7% on room air.  Sodium slightly low at 133 with a potassium of

 4.0.  Chloride is 97 with a bicarb of 25.  Anion gap is 15.0.  BUN is 21 with a

 creatinine of 1.4.  GFR is 51.  Glucose 127 bedside it was 117.  Lactic acid 

1.6.  Calcium is 9.8.  Magnesium is low at 1.5.  Serum ferritin is elevated at 

653.  Total bilirubin is 0.8 with an AST of 26 and an ALT of 65.  Alk phos stays

 normal at 64.  LDH is normal at 108.  Troponin I was less than 0.017.  C-

reactive protein elevated at 5.9.  BNP was 115.  Total protein 7.8 with an 

albumin fraction of 3.6.  Ketones were negative or less than 0.01.  Patient  is 

COVID positive.  I have discussed the results with the patient and he prefers to

 be transferred to [facility] as this is where he receives his

 usual care.  Discussed the case with Dr. Robin elliott patient be 

transferred to a higher level of care.  Patient is seen by the [facility] system 

and will therefore be transferred to [facility] in [place].  Patient's 

risk factors are about of obesity.  Hypertension.  Type 2 diabetes and chronic 

hypothyroidism.


07/29/2020: 22:12:  Disussed the case with 1 call nurse at [facility] in 

[place] and Dr. Barron [facility] and she is excepted care of this patient.  

Currently waiting for our [place] ambulance to return from [place] from a 

transport to provide transport back to [place] for this patient.


07/29/20 23:27 Ambulance is now back from [facility] to provide transport to 

[facility] in [facility]. 














Departure





- Departure


Time of Disposition: 23:29


Disposition: DC/Tfer to [facility] 02


Condition: Serious


Clinical Impression: 


 Fever and chills, Neutrophilic leukocytosis, Lab test positive for detection of

 COVID-19 virus, Obesity (BMI 30-39.9), Hypoxia, Hypothyroidism





Right middle lobe pneumonia


Qualifiers:


 Pneumonia type: due to unspecified organism Qualified Code(s): J18.9 - 

Pneumonia, unspecified organism





Type 2 diabetes mellitus


Qualifiers:


 Diabetes mellitus long term insulin use: without long term use Diabetes 

mellitus complication status: without complication Qualified Code(s): E11.9 - 

Type 2 diabetes mellitus without complications








- Discharge Information


*PRESCRIPTION DRUG MONITORING PROGRAM REVIEWED*: Not Applicable


*COPY OF PRESCRIPTION DRUG MONITORING REPORT IN PATIENT [facility]: Not Applicable


Instructions:  Type 2 Diabetes Mellitus, Diagnosis, Adult, COVID-19, Hypoxemia, 

Upper Respiratory Infection, Adult, Easy-to-Read


Referrals: 


Favian Mahoney MD [Primary Care Provider] - 


Forms:  ED Department Discharge


Additional Instructions: 


Patient transferred to [facility] due to COVID-19 positive 

testing.  Patient clinically has pneumonia right middle lobe on chest x-ray.  

Acute onset of fever and chills with Rigor's earlier this afternoon.  Patient 

has multiple comorbidities i.e. obesity.  Hypertension.  Hypoxemia at rest.  

Hypothyroidism.  Type 2 diabetes.





Sepsis Event Note (ED)





- Evaluation


Sepsis Screening Result: Possible Sepsis Risk





- Focused Exam


Vital Signs: 


                                   Vital Signs











  Temp Temp Pulse Resp BP Pulse Ox


 


 07/29/20 20:57  39.1 C H     


 


 07/29/20 20:12   39.1 C H  102 H  19  150/84 H  90 L














- My Orders


Last 24 Hours: 


My Active Orders





07/29/20 20:34


Chest 2V [CR] Stat 





07/29/20 20:35


Blood Glucose Check, Bedside [RC] ONETIME 


Oxygen Therapy [RC] ASDIRECTED 





07/29/20 20:36


Blood Culture x2 Reflex Set [OM.PC] Stat 





07/29/20 20:45


Sodium Chloride 0.9% [Normal Saline] 1,000 ml IV ASDIRECTED 





07/29/20 20:52


EKG Documentation Completion [RC] STAT 





07/29/20 21:08


CULTURE BLOOD [BC] Stat 





07/29/20 21:18


CULTURE BLOOD [BC] Stat 





07/29/20 22:00


cefTRIAXone [Rocephin] 2 gm   Sodium Chloride 0.9% [Normal Saline] 100 ml IV 

Q24H 














- Assessment/Plan


Last 24 Hours: 


My Active Orders





07/29/20 20:34


Chest 2V [CR] Stat 





07/29/20 20:35


Blood Glucose Check, Bedside [RC] ONETIME 


Oxygen Therapy [RC] ASDIRECTED 





07/29/20 20:36


Blood Culture x2 Reflex Set [OM.PC] Stat 





07/29/20 20:45


Sodium Chloride 0.9% [Normal Saline] 1,000 ml IV ASDIRECTED 





07/29/20 20:52


EKG Documentation Completion [RC] STAT 





07/29/20 21:08


CULTURE BLOOD [BC] Stat 





07/29/20 21:18


CULTURE BLOOD [BC] Stat 





07/29/20 22:00


cefTRIAXone [Rocephin] 2 gm   Sodium Chloride 0.9% [Normal Saline] 100 ml IV 

Q24H

## 2020-07-30 NOTE — CR
Chest: 2 views of the chest were obtained.

 

Comparison: No prior chest imaging is available.

 

Heart size and mediastinum are within normal limits.  Lungs are clear 

with no acute parenchymal change.  Bony structures shows minimal 

degenerative change within the spine with disc space narrowing and 

endplate spurring.  This is noted within the mid and lower thoracic 

spine.

 

Impression:

1.  Findings as noted above.

2.  Nothing acute is suspected.

 

Diagnostic code #2

 

This report was dictated in MDT

## 2021-11-03 NOTE — EDM.PDOC
ED HPI GENERAL MEDICAL PROBLEM





- General


Chief Complaint: Abdominal Pain


Stated Complaint: LOWER ABDOMINAL PAIN


Time Seen by Provider: 03/10/18 13:04


Source of Information: Reports: Patient, RN Notes Reviewed





- History of Present Illness


INITIAL COMMENTS - FREE TEXT/NARRATIVE: 





62 year old male comes in with RLQ abd pain that started about 3 to 4 hrs ago, 

has had some nausea  and dry heaves, the pain did come first, no radiation to 

back,  he did have some similar discomfort 2 days ago that lasted only for a 

couple of hours.  Hx diverticulitis.  No major pain L abd, no fever, chills or 

diarrhea.  No chest pain or difficulty breathing. 


Treatments PTA: Reports: Other (see below)


Other Treatments PTA: none


  ** Right Lower Abdomen


Pain Score (Numeric/FACES): 9





- Related Data


 Allergies











Allergy/AdvReac Type Severity Reaction Status Date / Time


 


No Known Allergies Allergy   Verified 04/03/14 19:22











Home Meds: 


 Home Meds





Allopurinol [Zyloprim] 100 mg PO QAM 04/03/14 [History]


Aspirin [Halfprin] 81 mg PO DAILY 04/03/14 [History]


Fenofibrate Nanocrystallized [Tricor] 145 mg PO DAILY 04/03/14 [History]


Levothyroxine [Synthroid] 50 mcg PO QAM 04/03/14 [History]


Losartan [Cozaar] 50 mg PO DAILY 04/03/14 [History]


Metoprolol Tartrate [Lopressor] 25 mg PO BID 04/03/14 [History]


Mometasone Furoate [Nasonex Spray] 2 spray RENETTA QPM 04/03/14 [History]


Niacin [Niacin ER] 1,000 mg PO DAILY 04/03/14 [History]


Omega-3 Fatty Acids [Omega-3] 2,000 mg PO BID 04/03/14 [History]


Omeprazole 20 mg PO BIDAC 04/03/14 [History]


Simvastatin [Zocor] 20 mg PO DAILY 04/03/14 [History]


L Gasseri/B Bifidum/B Longum [[employer] Colon Health Capsule] 1 tab PO DAILY 03/

10/18 [History]


Ondansetron [Zofran ODT] 4 mg PO Q6H PRN #10 tab.dis 03/10/18 [Rx]


Tamsulosin [Tamsulosin 24 Hr] 0.4 mg PO DAILY #7 cap.er 03/10/18 [Rx]


oxyCODONE HCl/Acetaminophen [Percocet 5-325 mg Tablet] 1 each PO Q6HR PRN #20 

tablet 03/10/18 [Rx]











Past Medical History


Cardiovascular History: Reports: High Cholesterol, Hypertension


Respiratory History: Reports: Other (See Below)


Other Respiratory History: c-pap at night


Gastrointestinal History: Reports: Diverticulosis, GERD, Hemorrhoids


Musculoskeletal History: Reports: Gout


Endocrine/Metabolic History: Reports: Hypothyroidism, Other (See Below)


Other Endocrine/Metabolic History: pre diabetic





- Past Surgical History


GI Surgical History: Reports: Colonoscopy, EGD, Hernia, Abdominal, Other (See 

Below)


Other GI Surgeries/Procedures: fatty liver





Social & Family History





- Tobacco Use


Smoking Status *Q: Never Smoker





- Caffeine Use


Caffeine Use: Reports: Soda


Other Caffeine Use: diet pepsi





- Recreational Drug Use


Recreational Drug Use: No





ED ROS GENERAL





- Review of Systems


Review Of Systems: See Below


Constitutional: Denies: Fever, Chills


HEENT: Denies: Throat Pain


Respiratory: Denies: Shortness of Breath, Wheezing, Pleuritic Chest Pain


Cardiovascular: Denies: Chest Pain


GI/Abdominal: Reports: Abdominal Pain, Nausea, Vomiting.  Denies: Diarrhea, 

Hematochezia, Melena


Musculoskeletal: Reports: No Symptoms.  Denies: Back Pain


Skin: Reports: No Symptoms


Neurological: Reports: No Symptoms





ED EXAM, GI/ABD





- Physical Exam


Exam: See Below


General Appearance: Alert, Moderate Distress


Throat/Mouth: Normal Inspection, Normal Oropharynx


Head: Atraumatic.  No: Facial Swelling


Neck: Supple, Full Range of Motion


Respiratory/Chest: No Respiratory Distress, Lungs Clear, Normal Breath Sounds


Cardiovascular: Regular Rate, Rhythm, Bradycardia


GI/Abdominal Exam: Soft, Tender (RLQ).  No: Guarding, Rebound


Back Exam: No: CVA Tenderness (L), CVA Tenderness (R)


Extremities: Normal Inspection, Normal Range of Motion.  No: Leg Pain


Neurological: Alert, No Motor/Sensory Deficits


Skin Exam: Warm, Dry, Normal Color





Course





- Vital Signs


Last Recorded V/S: 


 Last Vital Signs











Temp  97.8 F   03/10/18 13:10


 


Pulse  55 L  03/10/18 13:10


 


Resp  20   03/10/18 13:10


 


BP  171/78 H  03/10/18 13:10


 


Pulse Ox  94 L  03/10/18 13:10














- Orders/Labs/Meds


Orders: 


 Active Orders 24 hr











 Category Date Time Status


 


 Peripheral IV Care [RC] .AS DIRECTED Care  03/10/18 13:27 Active


 


 Abdomen 2V AP Flat Upright [CR] Stat Exams  03/10/18 13:26 Taken


 


 Abdomen Pelvis wo Cont [CT] Stat Exams  03/10/18 15:41 Taken


 


 Sodium Chloride 0.9% [Normal Saline] 1,000 ml Med  03/10/18 13:30 Active





 IV ONETIME   


 


 Sodium Chloride 0.9% [Normal Saline] 100 ml Med  03/10/18 15:45 Active





 IV ASDIRECTED   


 


 Sodium Chloride 0.9% [Saline Flush] Med  03/10/18 13:27 Active





 10 ml FLUSH ASDIRECTED PRN   


 


 Peripheral IV Insertion Adult [OM.PC] Stat Oth  03/10/18 13:27 Ordered








 Medication Orders





Sodium Chloride (Normal Saline)  1,000 mls @ 999 mls/hr IV ONETIME JOELLE


   Last Admin: 03/10/18 13:37  Dose: 999 mls/hr


Sodium Chloride (Normal Saline)  100 mls @ 60 mls/hr IV ASDIRECTED JOELLE


Sodium Chloride (Saline Flush)  10 ml FLUSH ASDIRECTED PRN


   PRN Reason: Keep Vein Open


   Last Admin: 03/10/18 13:37  Dose: 10 ml








Labs: 


 Laboratory Tests











  03/10/18 03/10/18 03/10/18 Range/Units





  13:25 13:25 13:25 


 


WBC   12.66 H   (4.23-9.07)  K/mm3


 


RBC   5.32   (4.63-6.08)  M/mm3


 


Hgb   15.5   (13.7-17.5)  gm/L


 


Hct   46.7   (40.1-51.0)  %


 


MCV   87.8   (79.0-92.2)  fl


 


MCH   29.1   (25.7-32.2)  pg


 


MCHC   33.2   (32.2-35.5)  g/dl


 


RDW Std Deviation   41.1   (35.1-43.9)  fL


 


Plt Count   256   (163-337)  K/mm3


 


MPV   10.0   (9.4-12.3)  fl


 


Neut % (Auto)   79.5 H   (34.0-67.9)  %


 


Lymph % (Auto)   11.4 L   (21.8-53.1)  %


 


Mono % (Auto)   7.5   (5.3-12.2)  %


 


Eos % (Auto)   0.7 L   (0.8-7.0)  


 


Baso % (Auto)   0.3   (0.1-1.2)  %


 


Neut # (Auto)   10.07 H   (1.78-5.38)  K/mm3


 


Lymph # (Auto)   1.44   (1.32-3.57)  K/mm3


 


Mono # (Auto)   0.95 H   (0.30-0.82)  K/mm3


 


Eos # (Auto)   0.09   (0.04-0.54)  K/mm3


 


Baso # (Auto)   0.04   (0.01-0.08)  K/mm3


 


Sodium    142  (136-145)  mEq/L


 


Potassium    4.1  (3.5-5.1)  mEq/L


 


Chloride    106  ()  mEq/L


 


Carbon Dioxide    30  (21-32)  mEq/L


 


Anion Gap    10.1  (5-15)  


 


BUN    24 H  (7-18)  mg/dL


 


Creatinine    1.4 H  (0.7-1.3)  mg/dL


 


Est Cr Clr Drug Dosing    54.29  mL/min


 


Estimated GFR (MDRD)    52  (>60)  mL/min


 


BUN/Creatinine Ratio    17.1  (14-18)  


 


Glucose    116 H  ()  mg/dL


 


Calcium    9.7  (8.5-10.1)  mg/dL


 


Total Bilirubin    0.6  (0.2-1.0)  mg/dL


 


AST    27  (15-37)  U/L


 


ALT    47  (16-63)  U/L


 


Alkaline Phosphatase    57  ()  U/L


 


C-Reactive Protein  < 0.2    (<1.0)  mg/dL


 


Total Protein    7.4  (6.4-8.2)  g/dl


 


Albumin    3.9  (3.4-5.0)  g/dl


 


Globulin    3.5  gm/dL


 


Albumin/Globulin Ratio    1.1  (1-2)  


 


Urine Color     (Yellow)  


 


Urine Appearance     (Clear)  


 


Urine pH     (5.0-8.0)  


 


Ur Specific Gravity     (1.005-1.030)  


 


Urine Protein     (Negative)  


 


Urine Glucose (UA)     (Negative)  


 


Urine Ketones     (Negative)  


 


Urine Occult Blood     (Negative)  


 


Urine Nitrite     (Negative)  


 


Urine Bilirubin     (Negative)  


 


Urine Urobilinogen     (0.2-1.0)  


 


Ur Leukocyte Esterase     (Negative)  


 


Urine RBC     (0-5)  /hpf


 


Urine WBC     (0-5)  /hpf


 


Ur Epithelial Cells     (0-5)  /hpf


 


Urine Bacteria     (FEW)  /hpf


 


Urine Mucus     (FEW)  /hpf














  03/10/18 Range/Units





  14:55 


 


WBC   (4.23-9.07)  K/mm3


 


RBC   (4.63-6.08)  M/mm3


 


Hgb   (13.7-17.5)  gm/L


 


Hct   (40.1-51.0)  %


 


MCV   (79.0-92.2)  fl


 


MCH   (25.7-32.2)  pg


 


MCHC   (32.2-35.5)  g/dl


 


RDW Std Deviation   (35.1-43.9)  fL


 


Plt Count   (163-337)  K/mm3


 


MPV   (9.4-12.3)  fl


 


Neut % (Auto)   (34.0-67.9)  %


 


Lymph % (Auto)   (21.8-53.1)  %


 


Mono % (Auto)   (5.3-12.2)  %


 


Eos % (Auto)   (0.8-7.0)  


 


Baso % (Auto)   (0.1-1.2)  %


 


Neut # (Auto)   (1.78-5.38)  K/mm3


 


Lymph # (Auto)   (1.32-3.57)  K/mm3


 


Mono # (Auto)   (0.30-0.82)  K/mm3


 


Eos # (Auto)   (0.04-0.54)  K/mm3


 


Baso # (Auto)   (0.01-0.08)  K/mm3


 


Sodium   (136-145)  mEq/L


 


Potassium   (3.5-5.1)  mEq/L


 


Chloride   ()  mEq/L


 


Carbon Dioxide   (21-32)  mEq/L


 


Anion Gap   (5-15)  


 


BUN   (7-18)  mg/dL


 


Creatinine   (0.7-1.3)  mg/dL


 


Est Cr Clr Drug Dosing   mL/min


 


Estimated GFR (MDRD)   (>60)  mL/min


 


BUN/Creatinine Ratio   (14-18)  


 


Glucose   ()  mg/dL


 


Calcium   (8.5-10.1)  mg/dL


 


Total Bilirubin   (0.2-1.0)  mg/dL


 


AST   (15-37)  U/L


 


ALT   (16-63)  U/L


 


Alkaline Phosphatase   ()  U/L


 


C-Reactive Protein   (<1.0)  mg/dL


 


Total Protein   (6.4-8.2)  g/dl


 


Albumin   (3.4-5.0)  g/dl


 


Globulin   gm/dL


 


Albumin/Globulin Ratio   (1-2)  


 


Urine Color  Dark yellow  (Yellow)  


 


Urine Appearance  Slt cloudy H  (Clear)  


 


Urine pH  6.5  (5.0-8.0)  


 


Ur Specific Gravity  1.020  (1.005-1.030)  


 


Urine Protein  Trace H  (Negative)  


 


Urine Glucose (UA)  Negative  (Negative)  


 


Urine Ketones  Negative  (Negative)  


 


Urine Occult Blood  3+ H  (Negative)  


 


Urine Nitrite  Negative  (Negative)  


 


Urine Bilirubin  Negative  (Negative)  


 


Urine Urobilinogen  0.2  (0.2-1.0)  


 


Ur Leukocyte Esterase  Negative  (Negative)  


 


Urine RBC  50-75 H  (0-5)  /hpf


 


Urine WBC  0-5  (0-5)  /hpf


 


Ur Epithelial Cells  0-5  (0-5)  /hpf


 


Urine Bacteria  Few  (FEW)  /hpf


 


Urine Mucus  Few  (FEW)  /hpf











Meds: 


Medications











Generic Name Dose Route Start Last Admin





  Trade Name Freq  PRN Reason Stop Dose Admin


 


Sodium Chloride  1,000 mls @ 999 mls/hr  03/10/18 13:30  03/10/18 13:37





  Normal Saline  IV   999 mls/hr





  ONETIME JOELLE   Administration


 


Sodium Chloride  100 mls @ 60 mls/hr  03/10/18 15:45  





  Normal Saline  IV   





  ASDIRECTED JOELLE   


 


Sodium Chloride  10 ml  03/10/18 13:27  03/10/18 13:37





  Saline Flush  FLUSH   10 ml





  ASDIRECTED PRN   Administration





  Keep Vein Open   














Discontinued Medications














Generic Name Dose Route Start Last Admin





  Trade Name Freq  PRN Reason Stop Dose Admin


 


Diatrizoate Meglum/Diatrizoate Sod  90 ml  03/10/18 15:34  





  Gastrografin 37%  PO  03/10/18 15:35  





  ONETIME ONE   


 


Iopamidol  100 ml  03/10/18 15:34  





  Isovue-370 (76%)  IVPUSH  03/10/18 15:35  





  ONETIME ONE   


 


Morphine Sulfate  10 mg  03/10/18 13:30  03/10/18 13:37





  Morphine  IVPUSH  03/10/18 13:31  10 mg





  ONETIME ONE   Administration


 


Morphine Sulfate  4 mg  03/10/18 16:40  03/10/18 16:45





  Morphine  IVPUSH  03/10/18 16:41  4 mg





  ONETIME ONE   Administration


 


Ondansetron HCl  4 mg  03/10/18 13:27  03/10/18 13:37





  Zofran  IVPUSH  03/10/18 13:28  4 mg





  ONETIME ONE   Administration


 


Ondansetron HCl  4 mg  03/10/18 15:03  03/10/18 15:07





  Zofran  IVPUSH  03/10/18 15:04  4 mg





  ONETIME ONE   Administration


 


Sodium Chloride  10 ml  03/10/18 15:34  





  Saline Flush  FLUSH  03/10/18 15:35  





  ONETIME ONE   


 


Tamsulosin HCl  0.4 mg  03/10/18 17:22  





  Flomax  PO  03/10/18 17:23  





  ONETIME ONE   














- Re-Assessments/Exams


Free Text/Narrative Re-Assessment/Exam: 





03/10/18 17:27.  he has a 4 mm stone distal R ureter, feeling much better after 

3 doses of morphine. 








Departure





- Departure


Time of Disposition: 17:21


Disposition: Home, Self-Care 01


Condition: Fair


Clinical Impression: 


 Kidney stone on right side








- Discharge Information


Prescriptions: 


oxyCODONE HCl/Acetaminophen [Percocet 5-325 mg Tablet] 1 each PO Q6HR PRN #20 

tablet


 PRN Reason: Pain


Ondansetron [Zofran ODT] 4 mg PO Q6H PRN #10 tab.dis


 PRN Reason: Nausea/Vomiting


Tamsulosin [Tamsulosin 24 Hr] 0.4 mg PO DAILY #7 cap.er


Referrals: 


Favian Mahoney MD [Primary Care Provider] - 


Forms:  ED Department Discharge


Additional Instructions: 


strain urine to watch for stone,  flomax 0.4 mg daily,  tylenol for mild to 

moderate discomfort or percocet if needed for severe pain,  do not drive, work 

or operate equipment when taking percocet,  zofran if needed for nausea or 

vomiting.  If you have not passed the stone within 2 to 3 days see Dr Fountain or one of the other clinic providers, return to ED as needed. 





- My Orders


Last 24 Hours: 


My Active Orders





03/10/18 13:26


Abdomen 2V AP Flat Upright [CR] Stat 





03/10/18 13:27


Peripheral IV Care [RC] .AS DIRECTED 


Sodium Chloride 0.9% [Saline Flush]   10 ml FLUSH ASDIRECTED PRN 


Peripheral IV Insertion Adult [OM.PC] Stat 





03/10/18 13:30


Sodium Chloride 0.9% [Normal Saline] 1,000 ml IV ONETIME 





03/10/18 15:41


Abdomen Pelvis wo Cont [CT] Stat 





03/10/18 15:45


Sodium Chloride 0.9% [Normal Saline] 100 ml IV ASDIRECTED 














- Assessment/Plan


Last 24 Hours: 


My Active Orders





03/10/18 13:26


Abdomen 2V AP Flat Upright [CR] Stat 





03/10/18 13:27


Peripheral IV Care [RC] .AS DIRECTED 


Sodium Chloride 0.9% [Saline Flush]   10 ml FLUSH ASDIRECTED PRN 


Peripheral IV Insertion Adult [OM.PC] Stat 





03/10/18 13:30


Sodium Chloride 0.9% [Normal Saline] 1,000 ml IV ONETIME 





03/10/18 15:41


Abdomen Pelvis wo Cont [CT] Stat 





03/10/18 15:45


Sodium Chloride 0.9% [Normal Saline] 100 ml IV ASDIRECTED Hemigard Postcare Instructions: The HEMIGARD strips are to remain completely dry for at least 5-7 days.